# Patient Record
Sex: FEMALE | Race: WHITE | Employment: OTHER | URBAN - METROPOLITAN AREA
[De-identification: names, ages, dates, MRNs, and addresses within clinical notes are randomized per-mention and may not be internally consistent; named-entity substitution may affect disease eponyms.]

---

## 2020-02-21 NOTE — PATIENT DISCUSSION
Continue current management.  Advised may need additional glc surgery (TCNR) in the future.  Will request previous HVF from [place].

## 2020-12-18 NOTE — PATIENT DISCUSSION
Continue current management.  Advised may need additional glc surgery (TCNR) in the future. Pt arrived to floor in stable condition, CBI was stopped in the ED, pt's urine was bloody  Dr Patricio Klein made aware, CBI re-started at 2045  Pt's spears was leaking and pt was experiencing bladder spasms, Dr Patricio Klein made aware, new order for spears irrigation received  Pt's spears was irrigated 5 times with a return of bloody urine with blood clots  Currently patient's urine is pink -tinged with CBI, pt is not complaining of bladder spasms at this time  Will continue to monitor

## 2021-09-24 NOTE — PATIENT DISCUSSION
HVF SHOWS POSSIBLE CHANGES VS FLUCTUATION OU.  CONTINUE TO MONITOR CLOSELY.  CONTINUE CURRENT MANAGEMENT.

## 2022-02-18 NOTE — PATIENT DISCUSSION
OCT SHOWS FLUCTUATION VS CHANGES.  LIKELY DUE TO HAZY VIEW OD.  IOP IN TARGET RANGE.  CONTINUE CURRENT MANAGEMENT.

## 2022-04-29 ENCOUNTER — NEW PATIENT (OUTPATIENT)
Dept: URBAN - METROPOLITAN AREA CLINIC 50 | Facility: CLINIC | Age: 82
End: 2022-04-29

## 2022-04-29 DIAGNOSIS — H35.342: ICD-10-CM

## 2022-04-29 DIAGNOSIS — H43.811: ICD-10-CM

## 2022-04-29 PROCEDURE — 92004 COMPRE OPH EXAM NEW PT 1/>: CPT

## 2022-04-29 PROCEDURE — 92134 CPTRZ OPH DX IMG PST SGM RTA: CPT

## 2022-04-29 ASSESSMENT — VISUAL ACUITY
OU_CC: 20/30
OS_CC: CF 4FT
OD_CC: 20/30
OD_GLARE: 20/30
OU_CC: J1
OD_GLARE: 20/25

## 2022-04-29 ASSESSMENT — TONOMETRY
OD_IOP_MMHG: 15
OS_IOP_MMHG: 15

## 2023-04-28 ENCOUNTER — COMPREHENSIVE EXAM (OUTPATIENT)
Dept: URBAN - METROPOLITAN AREA CLINIC 50 | Facility: CLINIC | Age: 83
End: 2023-04-28

## 2023-04-28 DIAGNOSIS — H43.811: ICD-10-CM

## 2023-04-28 DIAGNOSIS — H35.3112: ICD-10-CM

## 2023-04-28 DIAGNOSIS — H35.3124: ICD-10-CM

## 2023-04-28 DIAGNOSIS — H35.342: ICD-10-CM

## 2023-04-28 DIAGNOSIS — H52.4: ICD-10-CM

## 2023-04-28 DIAGNOSIS — E11.9: ICD-10-CM

## 2023-04-28 PROCEDURE — 92015 DETERMINE REFRACTIVE STATE: CPT

## 2023-04-28 PROCEDURE — 92134 CPTRZ OPH DX IMG PST SGM RTA: CPT

## 2023-04-28 PROCEDURE — 92014 COMPRE OPH EXAM EST PT 1/>: CPT

## 2023-04-28 ASSESSMENT — VISUAL ACUITY
OD_PH: 20/25-1
OD_CC: 20/40-1
OS_CC: CF 3FT
OU_CC: J5@16"
OU_CC: 20/40-2
OD_GLARE: 20/40
OD_GLARE: 20/40

## 2023-04-28 ASSESSMENT — TONOMETRY
OS_IOP_MMHG: 15
OD_IOP_MMHG: 15

## 2024-05-14 ENCOUNTER — COMPREHENSIVE EXAM (OUTPATIENT)
Dept: URBAN - METROPOLITAN AREA CLINIC 53 | Facility: CLINIC | Age: 84
End: 2024-05-14

## 2024-05-14 DIAGNOSIS — H52.4: ICD-10-CM

## 2024-05-14 DIAGNOSIS — H35.3124: ICD-10-CM

## 2024-05-14 DIAGNOSIS — H35.342: ICD-10-CM

## 2024-05-14 DIAGNOSIS — H35.3112: ICD-10-CM

## 2024-05-14 DIAGNOSIS — H43.811: ICD-10-CM

## 2024-05-14 DIAGNOSIS — E11.9: ICD-10-CM

## 2024-05-14 PROCEDURE — 99214 OFFICE O/P EST MOD 30 MIN: CPT

## 2024-05-14 PROCEDURE — 92134 CPTRZ OPH DX IMG PST SGM RTA: CPT

## 2024-05-14 ASSESSMENT — VISUAL ACUITY
OS_CC: CF 3FT
OD_CC: 20/25
OU_CC: J1+

## 2024-05-14 ASSESSMENT — TONOMETRY
OD_IOP_MMHG: 19
OS_IOP_MMHG: 15

## 2025-04-25 ENCOUNTER — COMPREHENSIVE EXAM (OUTPATIENT)
Age: 85
End: 2025-04-25

## 2025-04-25 DIAGNOSIS — H35.342: ICD-10-CM

## 2025-04-25 DIAGNOSIS — H52.4: ICD-10-CM

## 2025-04-25 DIAGNOSIS — E11.9: ICD-10-CM

## 2025-04-25 DIAGNOSIS — H35.3211: ICD-10-CM

## 2025-04-25 DIAGNOSIS — H43.811: ICD-10-CM

## 2025-04-25 DIAGNOSIS — H35.3124: ICD-10-CM

## 2025-04-25 PROCEDURE — 92134 CPTRZ OPH DX IMG PST SGM RTA: CPT

## 2025-04-25 PROCEDURE — 92015 DETERMINE REFRACTIVE STATE: CPT

## 2025-04-25 PROCEDURE — 99214 OFFICE O/P EST MOD 30 MIN: CPT
